# Patient Record
Sex: MALE
[De-identification: names, ages, dates, MRNs, and addresses within clinical notes are randomized per-mention and may not be internally consistent; named-entity substitution may affect disease eponyms.]

---

## 2024-05-31 ENCOUNTER — APPOINTMENT (OUTPATIENT)
Dept: DERMATOLOGY | Facility: CLINIC | Age: 29
End: 2024-05-31
Payer: COMMERCIAL

## 2024-05-31 VITALS — WEIGHT: 215 LBS | HEIGHT: 70 IN | BODY MASS INDEX: 30.78 KG/M2

## 2024-05-31 DIAGNOSIS — Z80.8 FAMILY HISTORY OF MALIGNANT NEOPLASM OF OTHER ORGANS OR SYSTEMS: ICD-10-CM

## 2024-05-31 DIAGNOSIS — L70.0 ACNE VULGARIS: ICD-10-CM

## 2024-05-31 DIAGNOSIS — L73.0 ACNE KELOID: ICD-10-CM

## 2024-05-31 DIAGNOSIS — A63.0 ANOGENITAL (VENEREAL) WARTS: ICD-10-CM

## 2024-05-31 PROBLEM — Z00.00 ENCOUNTER FOR PREVENTIVE HEALTH EXAMINATION: Status: ACTIVE | Noted: 2024-05-31

## 2024-05-31 PROCEDURE — 17110 DESTRUCTION B9 LES UP TO 14: CPT

## 2024-05-31 PROCEDURE — 99204 OFFICE O/P NEW MOD 45 MIN: CPT | Mod: 25

## 2024-05-31 RX ORDER — ESCITALOPRAM OXALATE 20 MG/1
20 TABLET, FILM COATED ORAL
Refills: 0 | Status: ACTIVE | COMMUNITY

## 2024-06-01 PROBLEM — A63.0 CONDYLOMATA ACUMINATA IN MALE: Status: ACTIVE | Noted: 2024-06-01

## 2024-06-01 PROBLEM — L73.0 ACNE SCARRING: Status: ACTIVE | Noted: 2024-06-01

## 2024-06-01 PROBLEM — L70.0 ACNE VULGARIS: Status: ACTIVE | Noted: 2024-06-01

## 2024-06-01 RX ORDER — CLINDAMYCIN PHOSPHATE AND BENZOYL PEROXIDE 10; 50 MG/G; MG/G
1.2-5 GEL TOPICAL
Qty: 1 | Refills: 2 | Status: ACTIVE | COMMUNITY
Start: 2024-06-01 | End: 1900-01-01

## 2024-06-01 NOTE — HISTORY OF PRESENT ILLNESS
[FreeTextEntry1] : NPV- Acne / Warts [de-identified] : Bruno Isbell is a 27 y/o M presenting to the clinic for the above concerns.  1) Acne on face, chest, and back. Currently using BPO wash inconsistently.  Previously has been on Accutane (worked well but stopped after a few months, unclear why), doxycycline (did not help), and tretinoin.  Interested in either arazlo or tretinoin because his friend recommended it, would like to discuss today. Does not want to start any oral medications because he was recently started on omeprazole and lexapro and he and he feels a third pill would be too much right now.  2) warts on penile shaft x3. STD tested at [facility], reportedly negative

## 2024-06-01 NOTE — ASSESSMENT
[FreeTextEntry1] : #Acne vulgaris- moderate/severe with scarring Chronic condition, options discussed. In the setting of significant scarring, I recommended restarting course of oral isotretinoin. He reports being previously on isotretinoin but still had mild acne when it was stopped, although he was unsure why it was d/c'd.  He declines oral treatments at this time, would prefer to start w/ topical therapy and monitor. He is open to restarting accutane if things do not improve Plan as follows: -start benzaclin gel in AM  -start arazlo lotion TIW for about 1 month, then can increase to qHS as tolerated. SED including irritation, redness, peeling, and mild flaring of acne early on in treatment. Can also carefully apply thin layer to shoulders and back 3 nights a week -continue BPO wash in shower to chest and back  -RTC 2-3 mo  #  Condyloma [employer] Condyloma is caused by Human Papilloma virus, and is sexually transmitted. HPV has been associated with cervical cancer. It is imperative that all female patients and female sexual partners get an annual papsmear. Condyloma can resolve with cryotherapy or imiquimod, but usually recur. Patient was counseled on prevention of transmission while lesions are active through abstinence but should use barrier protection at a minimum. Patient was advised to contact office if condyloma fail to improve or spread despite months of treatment.   Location(s): penile shaft Number of lesions treated: 3   Cryotherapy - Procedure Note After obtaining verbal consent, including counseling on risks of pain, blistering, dyspigmentation and  numbness, liquid nitrogen was applied to the above lesions x 3 cycles with qtip. The patient tolerated the procedure well.  Wound care was reviewed. The patient was counseled additional treatments may be necessary and to notify me if additional cryotherapy is needed or if the lesions change or worsen.  RTC 8 weeks

## 2024-06-01 NOTE — PHYSICAL EXAM
[FreeTextEntry3] : On the forehead and cheeks there is severe scarring and scattered open and closed comedones with inflammatory pink papules. Chest and back- many inflammatory papules and nodules  penile shaft- 3 tan verrucous round papules   medical assistant Deepa Yoo was present throughout exam.

## 2024-06-03 RX ORDER — TAZAROTENE 0.45 MG/G
0.04 LOTION TOPICAL
Qty: 1 | Refills: 3 | Status: ACTIVE | COMMUNITY
Start: 2024-06-01

## 2024-07-16 ENCOUNTER — APPOINTMENT (OUTPATIENT)
Dept: DERMATOLOGY | Facility: CLINIC | Age: 29
End: 2024-07-16